# Patient Record
Sex: FEMALE | Race: WHITE | URBAN - METROPOLITAN AREA
[De-identification: names, ages, dates, MRNs, and addresses within clinical notes are randomized per-mention and may not be internally consistent; named-entity substitution may affect disease eponyms.]

---

## 2017-06-21 ENCOUNTER — HOSPITAL ENCOUNTER (EMERGENCY)
Facility: CLINIC | Age: 67
Discharge: HOME | End: 2017-06-21
Attending: EMERGENCY MEDICINE | Admitting: EMERGENCY MEDICINE
Payer: MEDICARE

## 2017-06-21 VITALS
TEMPERATURE: 98.3 F | OXYGEN SATURATION: 95 % | WEIGHT: 155 LBS | DIASTOLIC BLOOD PRESSURE: 71 MMHG | HEIGHT: 64 IN | SYSTOLIC BLOOD PRESSURE: 131 MMHG | HEART RATE: 103 BPM | RESPIRATION RATE: 16 BRPM | BODY MASS INDEX: 26.46 KG/M2

## 2017-06-21 DIAGNOSIS — S01.512A TONGUE LACERATION, INITIAL ENCOUNTER: ICD-10-CM

## 2017-06-21 PROCEDURE — 41250 REPAIR TONGUE LACERATION: CPT

## 2017-06-21 PROCEDURE — 99283 EMERGENCY DEPT VISIT LOW MDM: CPT | Mod: 25

## 2017-06-21 RX ORDER — LIDOCAINE HYDROCHLORIDE AND EPINEPHRINE 10; 10 MG/ML; UG/ML
INJECTION, SOLUTION INFILTRATION; PERINEURAL
Status: DISCONTINUED
Start: 2017-06-21 | End: 2017-06-21 | Stop reason: HOSPADM

## 2017-06-21 RX ORDER — PENICILLIN V POTASSIUM 500 MG/1
500 TABLET, FILM COATED ORAL 2 TIMES DAILY
Qty: 8 TABLET | Refills: 0 | Status: SHIPPED | OUTPATIENT
Start: 2017-06-21 | End: 2017-06-25

## 2017-06-21 ASSESSMENT — ENCOUNTER SYMPTOMS
DIARRHEA: 0
NAUSEA: 0
CHILLS: 0
DIZZINESS: 0
WOUND: 1
FEVER: 0
VOMITING: 0

## 2017-06-21 NOTE — ED AVS SNAPSHOT
[facility] Emergency Department    [address]    [facility] [address]    Phone:  [phone]    Fax:  [phone]                                       Lelia Harrington   MRN: [id number]    Department:  [facility] Emergency Department   Date of Visit:  6/21/2017           Patient Information     Date Of Birth          1950        Your diagnoses for this visit were:     Tongue laceration, initial encounter        You were seen by Kody Valencia MD.      Follow-up Information     Follow up with Ab Novak In 7 days.    Why:  As needed    Contact information:    [facility]  [address] [facility] [address]  [phone]        24 Hour Appointment Hotline       To make an appointment at any [facility], call [phone] ([phone]). If you don't have a family doctor or clinic, we will help you find one. [facility] are conveniently located to serve the needs of you and your family.             Review of your medicines      START taking        Dose / Directions Last dose taken    penicillin V potassium 500 MG tablet   Commonly known as:  VEETID   Dose:  500 mg   Quantity:  8 tablet        Take 1 tablet (500 mg) by mouth 2 times daily for 4 days   Refills:  0          Our records show that you are taking the medicines listed below. If these are incorrect, please call your family doctor or clinic.        Dose / Directions Last dose taken    FLUOXETINE HCL        None Entered   Refills:  0        KETOCONAZOLE (TOPICAL) 2 % Crea   Quantity:  30 gm        apply twice daily to affected skin   Refills:  0        LIPITOR PO        10 mg   Refills:  0        NASONEX NA        None Entered   Refills:  0        PREMARIN PO        None Entered   Refills:  0        TRIAMCINOLONE ACETONIDE (TOP) 0.1 % Crea   Quantity:  30 gm        apply twice daily to affected area   Refills:  0                Prescriptions were sent or printed at these locations (1  Prescription)                   Other Prescriptions                Printed at Department/Unit printer (1 of 1)         penicillin V potassium (VEETID) 500 MG tablet                Orders Needing Specimen Collection     None      Pending Results     No orders found from 6/19/2017 to 6/22/2017.            Pending Culture Results     No orders found from 6/19/2017 to 6/22/2017.            Pending Results Instructions     If you had any lab results that were not finalized at the time of your Discharge, you can call the ED Lab Result RN at [phone]. You will be contacted by this team for any positive Lab results or changes in treatment. The nurses are available 7 days a week from 10A to 6:30P.  You can leave a message 24 hours per day and they will return your call.        Test Results From Your Hospital Stay               Clinical Quality Measure: Blood Pressure Screening     Your blood pressure was checked while you were in the emergency department today. The last reading we obtained was  BP: 131/71 . Please read the guidelines below about what these numbers mean and what you should do about them.  If your systolic blood pressure (the top number) is less than 120 and your diastolic blood pressure (the bottom number) is less than 80, then your blood pressure is normal. There is nothing more that you need to do about it.  If your systolic blood pressure (the top number) is 120-139 or your diastolic blood pressure (the bottom number) is 80-89, your blood pressure may be higher than it should be. You should have your blood pressure rechecked within a year by a primary care provider.  If your systolic blood pressure (the top number) is 140 or greater or your diastolic blood pressure (the bottom number) is 90 or greater, you may have high blood pressure. High blood pressure is treatable, but if left untreated over time it can put you at risk for heart attack, stroke, or kidney failure. You should have your blood pressure  "rechecked by a primary care provider within the next 4 weeks.  If your provider in the emergency department today gave you specific instructions to follow-up with your doctor or provider even sooner than that, you should follow that instruction and not wait for up to 4 weeks for your follow-up visit.        Thank you for choosing [facility]       Thank you for choosing [facility] for your care. Our goal is always to provide you with excellent care. Hearing back from our patients is one way we can continue to improve our services. Please take a few minutes to complete the written survey that you may receive in the mail after you visit with us. Thank you!        [facility] Information     PayPay lets you send messages to your doctor, view your test results, renew your prescriptions, schedule appointments and more. To sign up, go to [email or web address] . Click on \"Log in\" on the left side of the screen, which will take you to the Welcome page. Then click on \"Sign up Now\" on the right side of the page.     You will be asked to enter the access code listed below, as well as some personal information. Please follow the directions to create your username and password.     Your access code is: [email or web address]  Expires: 2017  2:46 PM     Your access code will  in 90 days. If you need help or a new code, please call your [facility] clinic or [phone].        Care EveryWhere ID     This is your Care EveryWhere ID. This could be used by other organizations to access your [facility] medical records  [id number]        Equal Access to Services     PAL LAGOS : Hadii tracy mccray hadasho Soomaali, waaxda luqadaha, qaybta kaalmada adeegyada, marisol miles . So [facility] [phone].    ATENCIÓN: Si habla español, tiene a mclean disposición servicios gratuitos de asistencia lingüística. Llame al [phone].    We comply with applicable federal civil rights laws and [place] laws. We do not discriminate on " the basis of race, color, national origin, age, disability sex, sexual orientation or gender identity.            After Visit Summary       This is your record. Keep this with you and show to your community pharmacist(s) and doctor(s) at your next visit.

## 2017-06-21 NOTE — ED PROVIDER NOTES
"  History     Chief Complaint:  Hematemesis (spitting blood)    HPI   Lelia Harrington is a 66 year old female who presents with a laceration to her tongue. The patient reports that for 45 minutes she has been swallowing a lot of fluid and upon spitting it up, she has been seeing blood. She is not actively vomiting blood at this time. She reports seasonal allergies, but denies any recent URI symptoms. Patient reports having a salad for lunch and not believing there was anything sharp consumed in the meal.    Allergies:  No Known Drug Allergies    Medications:    Lipitor  Premarin  Fluoxetine  Nasonex    Past Medical History:    The patient denies any relevant past medical history.    Past Surgical History:    History reviewed. No pertinent past surgical history.    Family History:    The patient denies any relevant family medical history.    Social History:  Smoking Status: No  Marital Status:        Review of Systems   Constitutional: Negative for chills and fever.   Gastrointestinal: Negative for diarrhea, nausea and vomiting.   Skin: Positive for wound (Midline of her tongue).   Neurological: Negative for dizziness.   All other systems reviewed and are negative.    Physical Exam   Vitals:  Patient Vitals for the past 24 hrs:   BP Temp Temp src Pulse Resp SpO2 Height Weight   06/21/17 1430 - - - - - 95 % - -   06/21/17 1415 - - - - - 96 % - -   06/21/17 1408 131/71 98.3  F (36.8  C) Oral 103 16 96 % 1.626 m (5' 4\") 70.3 kg (155 lb)   06/21/17 1407 - - - - - 96 % - -   06/21/17 1406 131/71 - - - - - - -     Physical Exam  General: Patient is alert and sitting comfortably in bed.  HEENT:   The scalp and head appear normal    The pupils are equal, round, and reactive to light    Extraocular muscles are intact.  Left anterior nasal septum irritated, actively bleeding  5 mm laceration on dorsal surface of tongue     The oropharynx is normal.      Uvula is in the midline.  There is no peritonsillar " abscess.  Neck:  Normal range of motion.    Lungs:  Clear.      No rales, no wheezing.      There is no tachypnea.  Non-labored.  Cardiac: Regular rate.      Normal S1 and S2.      No S3 or S4.      No pathological murmur.      No pericardial rub.  Abdomen: Soft. No distension. No tympani. No rebound. Non-tender.  Lymph: No anterior or posterior cervical lymphadenopathy noted.  MS:  Normal tone.      Normal movement of all extremities.    Neuro:  Normal mentation.  No focal motor or sensory changes.      Speech normal.  Psych:  Awake.     Alert.      Normal affect.      Appropriate interactions.  Skin:  No rash.      No lesions.      Emergency Department Course     Procedures:     Laceration Repair      LACERATION:  A superficial clean 5 mm laceration.    LOCATION:  Dorsal aspect of the tongue.    FUNCTION:  Distally sensation, circulation and motor are intact.    ANESTHESIA:  Local using 1% Lidocaine with epinephrine.    PREPARATION:  Irrigation and Scrubbing with Tap Water.    DEBRIDEMENT:  no debridement.    CLOSURE:  Wound was closed with One Layer.  Skin closed with 4 x 5.0 Ethylon using running sutures..    Interventions:  1421 Lidocaine 1% with epinephrine, 2cc, SQ     Emergency Department Course:  Nursing notes and vitals reviewed.  I performed an exam of the patient as documented above.   I sutured the laceration on the patient's tongue as documented above.    I discussed the treatment plan with the patient. They expressed understanding of this plan and consented to discharge. They will be discharged home with instructions for care and follow up. In addition, the patient will return to the emergency department if their symptoms persist, worsen, if new symptoms arise or if there is any concern.  All questions were answered.    I personally answered all related questions prior to discharge.    Impression & Plan      Medical Decision Making:  This patient interestingly had a laceration to the tongue as the  source of the bleeding down her throat. The area was anesthetized and then closed as mentioned in the procedure note. She will go home on penicillin for 5 days on profilactily soft diet to avoid mechanical disruption of the wound. Return for sevree pain, re bleeding, or there issues. Otherwise follow up 1 week with PCP    Impression  Tongue laceration    Diagnosis:    ICD-10-CM    1. Tongue laceration, initial encounter S01.512A      Disposition:   Discharge    Discharge Medications:  Discharge Medication List as of 6/21/2017  2:54 PM      START taking these medications    Details   penicillin V potassium (VEETID) 500 MG tablet Take 1 tablet (500 mg) by mouth 2 times daily for 4 days, Disp-8 tablet, R-0, Local Print           Scribe Disclosure:  I, Tom Ellis, am serving as a scribe at 2:06 PM on 6/21/2017 to document services personally performed by Kody Valencia MD, based on my observations and the provider's statements to me.    6/21/2017   [facility] EMERGENCY DEPARTMENT       Kody Valencia MD  06/21/17 7087

## 2017-06-21 NOTE — ED AVS SNAPSHOT
[facility] Emergency Department    [address]    [facility] [address]    Phone:  [phone]    Fax:  [phone]                                       Lelia Harrington   MRN: [id number]    Department:  [facility] Emergency Department   Date of Visit:  6/21/2017           After Visit Summary Signature Page     I have received my discharge instructions, and my questions have been answered. I have discussed any challenges I see with this plan with the nurse or doctor.    ..........................................................................................................................................  Patient/Patient Representative Signature      ..........................................................................................................................................  Patient Representative Print Name and Relationship to Patient    ..................................................               ................................................  Date                                            Time    ..........................................................................................................................................  Reviewed by Signature/Title    ...................................................              ..............................................  Date                                                            Time

## 2017-06-21 NOTE — ED NOTES
Patient states she feels blood dripping down back of throat today. ABC intact alert and no distress.

## 2017-09-16 ENCOUNTER — HOSPITAL ENCOUNTER (EMERGENCY)
Facility: CLINIC | Age: 67
Discharge: HOME | End: 2017-09-16
Attending: EMERGENCY MEDICINE | Admitting: EMERGENCY MEDICINE
Payer: MEDICARE

## 2017-09-16 VITALS
DIASTOLIC BLOOD PRESSURE: 74 MMHG | OXYGEN SATURATION: 98 % | SYSTOLIC BLOOD PRESSURE: 128 MMHG | HEART RATE: 72 BPM | WEIGHT: 160 LBS | RESPIRATION RATE: 20 BRPM | BODY MASS INDEX: 27.46 KG/M2 | TEMPERATURE: 98 F

## 2017-09-16 DIAGNOSIS — T14.8XXA BLEEDING FROM WOUND: ICD-10-CM

## 2017-09-16 PROCEDURE — 99282 EMERGENCY DEPT VISIT SF MDM: CPT

## 2017-09-16 PROCEDURE — 27211033 ZZH DRESSING QUICK CLOT 4X4

## 2017-09-16 ASSESSMENT — ENCOUNTER SYMPTOMS: WOUND: 1

## 2017-09-16 NOTE — ED AVS SNAPSHOT
[facility] Emergency Department    [address]    [facility] [address]    Phone:  [phone]    Fax:  [phone]                                       Lelia Harrington   MRN: [id number]    Department:  [facility] Emergency Department   Date of Visit:  9/16/2017           After Visit Summary Signature Page     I have received my discharge instructions, and my questions have been answered. I have discussed any challenges I see with this plan with the nurse or doctor.    ..........................................................................................................................................  Patient/Patient Representative Signature      ..........................................................................................................................................  Patient Representative Print Name and Relationship to Patient    ..................................................               ................................................  Date                                            Time    ..........................................................................................................................................  Reviewed by Signature/Title    ...................................................              ..............................................  Date                                                            Time

## 2017-09-16 NOTE — ED NOTES
Left breast needle biopsy today  Woke up with blood on bed and night gown into mattress pad  Was evaluated by ems  And sent here  Denies no dizziness or lightheaded while   Up    Here for eval

## 2017-09-16 NOTE — ED PROVIDER NOTES
History     Chief Complaint:  Wound Check      The history is provided by the patient.      Lelia Harrington is a 66 year old female who presents for a wound check. Patient underwent left breast needle biopsy yesterday 9/15/17. This morning she woke up with blood on her night gown and bed so she contacted EMS who brought her to the emergency department. She denies other complaint.     Allergies:  No known drug allergies     Medications:    Lipitor  Premarin  Fluoxetine  Nasonex  Triamcinolone cream  Ketoconazole cream    Past Medical History:    Arthritis  Depression    Past Surgical History:    Eye surgery  Gyn surgery  Orthopedic surgery  Breast biopsy 9/15/17    Family History:    History reviewed. No pertinent family history.      Social History:  Presents with spouse   Tobacco use: Never  Alcohol use: Negative  PCP: RYAN PARKER    Marital Status:      Review of Systems   Skin: Positive for wound.   All other systems reviewed and are negative.      Physical Exam     Patient Vitals for the past 24 hrs:   BP Temp Temp src Pulse Resp SpO2 Weight   09/16/17 0218 133/77 98  F (36.7  C) Oral 74 17 99 % 72.6 kg (160 lb)       Physical Exam  Constitutional:  Oriented to person, place, and time. Well appearing.  HENT:   Head:    Normocephalic.   Mouth/Throat:   Oropharynx is clear and moist.   Eyes:    EOM are normal. Pupils are equal, round, and reactive to light.   Neck:    Neck supple.   Cardiovascular:  Normal rate, regular rhythm and normal heart sounds.      Exam reveals no gallop and no friction rub.       No murmur heard.  Pulmonary/Chest:  Effort normal and breath sounds normal.  Equal breath sounds bilaterally.      No respiratory distress. No wheezes. No rales.      No reproducible chest wall pain.  Abdominal:   Soft. No distension. No tenderness. No rebound and no guarding.   Musculoskeletal:  Normal range of motion.   Neurological:   Alert and oriented to person, place, and time.           Moves all  4 extremities spontaneously    Skin:    No rash noted. No pallor.       Left central anterior breast 1 mm wound noted, not actively bleeding, no surrounding swelling, erythema, or warmth.     Emergency Department Course   Emergency Department Course:  Past medical records, nursing notes, and vitals reviewed.  0303: I performed an exam of the patient and obtained history, as documented above.  Breast exam was performed here in the emergency department. This was performed with female chaperone at bedside.   Surgicel placed with gauze and pressure dressing.   0315: I rechecked the patient.  Findings and plan explained to the Patient and spouse. Patient discharged home with instructions regarding supportive care, medications, and reasons to return. The importance of close follow-up was reviewed.      Impression & Plan    Medical Decision Making:  Lelia Harrington is a 66 year old female who underwent breast biopsy earlier today that was bleeding. The bleeding does appear to be resolved. To prevent further bleeding surgicel was placed and pressure dressing applied. No signs of infection. She is told to follow up with breast surgeon and return for further bleeding, signs of infection, or any symptoms of concern.     Diagnosis:    ICD-10-CM    1. Bleeding from wound T14.8        Disposition:  Discharged to home with plan for follow up through breast surgeon.         Hermilo Garcia  9/16/2017   [facility] EMERGENCY DEPARTMENT  I, Hermilo Garcia, am serving as a scribe at 3:03 AM on 9/16/2017 to document services personally performed by Jarett Whitmore MD based on my observations and the provider's statements to me.       Jarett Whitmore MD  09/16/17 0448

## 2017-09-16 NOTE — ED NOTES
Pt in with C/O bleeding from biopsy site. Pt reports she had a breast biopsy on Friday and noticed bleeding when she woke up this morning. Bleeding controlled PTA ABD pad and ace wrap applied by EMS. Pt A&O x 4 no S/S of acute distress.

## 2017-09-16 NOTE — ED AVS SNAPSHOT
[facility] Emergency Department    [address]    [facility] [address]    Phone:  [phone]    Fax:  [phone]                                       Lelia Harrington   MRN: [id number]    Department:  [facility] Emergency Department   Date of Visit:  9/16/2017           Patient Information     Date Of Birth          1950        Your diagnoses for this visit were:     Bleeding from wound        You were seen by Jarett Whitmore MD.      Follow-up Information     Follow up with Ab Novak. Call in 1 week.    Contact information:    [facility]  [address] [facility] [id number] [phone]        Discharge References/Attachments     POST OP WOUND CHECK, BLEEDING (ENGLISH)      24 Hour Appointment Hotline       To make an appointment at any [facility], call [phone] ([phone]). If you don't have a family doctor or clinic, we will help you find one. [facility] are conveniently located to serve the needs of you and your family.             Review of your medicines      Our records show that you are taking the medicines listed below. If these are incorrect, please call your family doctor or clinic.        Dose / Directions Last dose taken    FLUOXETINE HCL        None Entered   Refills:  0        KETOCONAZOLE (TOPICAL) 2 % Crea   Quantity:  30 gm        apply twice daily to affected skin   Refills:  0        LIPITOR PO        10 mg   Refills:  0        NASONEX NA        None Entered   Refills:  0        PREMARIN PO        None Entered   Refills:  0        TRIAMCINOLONE ACETONIDE (TOP) 0.1 % Crea   Quantity:  30 gm        apply twice daily to affected area   Refills:  0                Orders Needing Specimen Collection     None      Pending Results     No orders found from 9/14/2017 to 9/17/2017.            Pending Culture Results     No orders found from 9/14/2017 to 9/17/2017.            Pending Results Instructions     If you had any lab  results that were not finalized at the time of your Discharge, you can call the ED Lab Result RN at [phone]. You will be contacted by this team for any positive Lab results or changes in treatment. The nurses are available 7 days a week from 10A to 6:30P.  You can leave a message 24 hours per day and they will return your call.        Test Results From Your Hospital Stay               Clinical Quality Measure: Blood Pressure Screening     Your blood pressure was checked while you were in the emergency department today. The last reading we obtained was  BP: 133/77 . Please read the guidelines below about what these numbers mean and what you should do about them.  If your systolic blood pressure (the top number) is less than 120 and your diastolic blood pressure (the bottom number) is less than 80, then your blood pressure is normal. There is nothing more that you need to do about it.  If your systolic blood pressure (the top number) is 120-139 or your diastolic blood pressure (the bottom number) is 80-89, your blood pressure may be higher than it should be. You should have your blood pressure rechecked within a year by a primary care provider.  If your systolic blood pressure (the top number) is 140 or greater or your diastolic blood pressure (the bottom number) is 90 or greater, you may have high blood pressure. High blood pressure is treatable, but if left untreated over time it can put you at risk for heart attack, stroke, or kidney failure. You should have your blood pressure rechecked by a primary care provider within the next 4 weeks.  If your provider in the emergency department today gave you specific instructions to follow-up with your doctor or provider even sooner than that, you should follow that instruction and not wait for up to 4 weeks for your follow-up visit.        Thank you for choosing [facility]       Thank you for choosing [facility] for your care. Our goal is always to provide you with  "excellent care. Hearing back from our patients is one way we can continue to improve our services. Please take a few minutes to complete the written survey that you may receive in the mail after you visit with us. Thank you!        [employer] Information     [employer] lets you send messages to your doctor, view your test results, renew your prescriptions, schedule appointments and more. To sign up, go to [email or web address] . Click on \"Log in\" on the left side of the screen, which will take you to the Welcome page. Then click on \"Sign up Now\" on the right side of the page.     You will be asked to enter the access code listed below, as well as some personal information. Please follow the directions to create your username and password.     Your access code is: [email or web address]  Expires: 2017  2:46 PM     Your access code will  in 90 days. If you need help or a new code, please call your [place] clinic or [phone].        Care EveryWhere ID     This is your Care EveryWhere ID. This could be used by other organizations to access your [place] medical records  [id number]        Equal Access to Services     PAL LAGOS : Hadii tracy Cordova, wage[place]ia albarado, qamary kaalmablade hernandez, marisol miles . So [facility] [phone].    ATENCIÓN: Si habla español, tiene a mclean disposición servicios gratuitos de asistencia lingüística. Llame al [phone].    We comply with applicable federal civil rights laws and [place] laws. We do not discriminate on the basis of race, color, national origin, age, disability sex, sexual orientation or gender identity.            After Visit Summary       This is your record. Keep this with you and show to your community pharmacist(s) and doctor(s) at your next visit.                  "